# Patient Record
Sex: MALE | Race: WHITE | NOT HISPANIC OR LATINO | Employment: FULL TIME | URBAN - METROPOLITAN AREA
[De-identification: names, ages, dates, MRNs, and addresses within clinical notes are randomized per-mention and may not be internally consistent; named-entity substitution may affect disease eponyms.]

---

## 2018-05-03 ENCOUNTER — OFFICE VISIT (OUTPATIENT)
Dept: UROLOGY | Facility: MEDICAL CENTER | Age: 48
End: 2018-05-03
Payer: COMMERCIAL

## 2018-05-03 VITALS — WEIGHT: 206 LBS | HEIGHT: 72 IN | BODY MASS INDEX: 27.9 KG/M2

## 2018-05-03 DIAGNOSIS — R35.0 FREQUENCY OF URINATION: Primary | ICD-10-CM

## 2018-05-03 LAB
SL AMB  POCT GLUCOSE, UA: NORMAL
SL AMB LEUKOCYTE ESTERASE,UA: NORMAL
SL AMB POCT BILIRUBIN,UA: NORMAL
SL AMB POCT BLOOD,UA: NORMAL
SL AMB POCT CLARITY,UA: CLEAR
SL AMB POCT COLOR,UA: YELLOW
SL AMB POCT KETONES,UA: NORMAL
SL AMB POCT NITRITE,UA: NORMAL
SL AMB POCT PH,UA: 5.5
SL AMB POCT SPECIFIC GRAVITY,UA: 1.03
SL AMB POCT URINE PROTEIN: NORMAL
SL AMB POCT UROBILINOGEN: 0.2

## 2018-05-03 PROCEDURE — 99204 OFFICE O/P NEW MOD 45 MIN: CPT | Performed by: UROLOGY

## 2018-05-03 PROCEDURE — 81003 URINALYSIS AUTO W/O SCOPE: CPT | Performed by: UROLOGY

## 2018-05-03 NOTE — PROGRESS NOTES
Assessment/Plan:  1  Idiopathic long-term frequency, consistent with overactive bladder  No evidence of any physical or neurologic condition responsible for it  2   Options discussed, while history early can live with it, it is annoying as Erica Ornelas  I gave him some samples of Myrbetriq 25 mg per day, warned about side effects  If he wants to get a script to follow we will do so, I warned him might be expensive being brand name  3   Follow-up six months  No problem-specific Assessment & Plan notes found for this encounter  Diagnoses and all orders for this visit:    Frequency of urination  -     POCT urine dip auto non-scope          Subjective:      Patient ID: Lyssa Soares is a 41 y o  male  Long-term urinary for see going back at least 5-10 years  Worse the past 6-12 months  Will have to urinate quite soon after drinking liquids, voids every 1-2 hours all day long  He has learned to cut back is liquids before going to bed about 3 hours, and if he does this, only nocturia x1  Other times nocturia times 2-5  No burning, change in flow, history of infections stones incontinence or blood  Father has large BPH but no cancer  The following portions of the patient's history were reviewed and updated as appropriate: allergies, current medications, past family history, past medical history, past social history, past surgical history and problem list     Review of Systems   Musculoskeletal:        Needs a hip replacement   All other systems reviewed and are negative  Objective:      Ht 6' (1 829 m)   Wt 93 4 kg (206 lb)   BMI 27 94 kg/m²          Physical Exam   Constitutional: He is oriented to person, place, and time  He appears well-developed and well-nourished  No distress  HENT:   Head: Normocephalic and atraumatic  Eyes: Conjunctivae are normal    Cardiovascular: Normal rate and regular rhythm      Pulmonary/Chest: Effort normal and breath sounds normal  No respiratory distress  He has no wheezes  Abdominal: Soft  Bowel sounds are normal  He exhibits no distension and no mass  There is no tenderness  Genitourinary: Testes normal and penis normal    Genitourinary Comments: Prostate small, normal for age   Neurological: He is alert and oriented to person, place, and time  Skin: Skin is warm and dry  He is not diaphoretic

## 2023-03-06 ENCOUNTER — TELEPHONE (OUTPATIENT)
Dept: UROLOGY | Facility: AMBULATORY SURGERY CENTER | Age: 53
End: 2023-03-06

## 2023-03-06 NOTE — TELEPHONE ENCOUNTER
New Patient    What is the reason for the patient’s appointment? Prostate cancer screening frequent urinating tenderness of testicular     What office location does the patient prefer? [facility]    Imaging/Lab Results:    Do we accept the patient's insurance or is the patient Self-Pay? Yes     Insurance Provider: Shala Sage: [id number]  Member ID#: [id number]    Has the patient had any previous Urologist(s)? Dr Abida Thrasher 2018     Have patient records been requested? If not are records showing in [address] [place] [address] Rd: records in [facility]     Has the patient had any outside testing done? No     Does the patient have a personal history of cancer?  No

## 2023-03-20 ENCOUNTER — OFFICE VISIT (OUTPATIENT)
Dept: UROLOGY | Facility: MEDICAL CENTER | Age: 53
End: 2023-03-20

## 2023-03-20 VITALS
WEIGHT: 221.2 LBS | BODY MASS INDEX: 29.96 KG/M2 | OXYGEN SATURATION: 100 % | HEART RATE: 72 BPM | HEIGHT: 72 IN | DIASTOLIC BLOOD PRESSURE: 92 MMHG | SYSTOLIC BLOOD PRESSURE: 164 MMHG

## 2023-03-20 DIAGNOSIS — N50.811 RIGHT TESTICULAR PAIN: Primary | ICD-10-CM

## 2023-03-20 DIAGNOSIS — R35.0 FREQUENCY OF URINATION: ICD-10-CM

## 2023-03-20 DIAGNOSIS — Z12.5 PROSTATE CANCER SCREENING: ICD-10-CM

## 2023-03-20 LAB
BACTERIA UR QL AUTO: ABNORMAL /HPF
BILIRUB UR QL STRIP: NEGATIVE
CLARITY UR: CLEAR
COLOR UR: ABNORMAL
GLUCOSE UR STRIP-MCNC: NEGATIVE MG/DL
HGB UR QL STRIP.AUTO: ABNORMAL
KETONES UR STRIP-MCNC: NEGATIVE MG/DL
LEUKOCYTE ESTERASE UR QL STRIP: NEGATIVE
NITRITE UR QL STRIP: NEGATIVE
NON-SQ EPI CELLS URNS QL MICRO: ABNORMAL /HPF
PH UR STRIP.AUTO: 5.5 [PH]
PROT UR STRIP-MCNC: NEGATIVE MG/DL
RBC #/AREA URNS AUTO: ABNORMAL /HPF
SL AMB  POCT GLUCOSE, UA: ABNORMAL
SL AMB LEUKOCYTE ESTERASE,UA: ABNORMAL
SL AMB POCT BILIRUBIN,UA: ABNORMAL
SL AMB POCT BLOOD,UA: ABNORMAL
SL AMB POCT CLARITY,UA: CLEAR
SL AMB POCT COLOR,UA: YELLOW
SL AMB POCT KETONES,UA: ABNORMAL
SL AMB POCT NITRITE,UA: ABNORMAL
SL AMB POCT PH,UA: 5.5
SL AMB POCT SPECIFIC GRAVITY,UA: 1.02
SL AMB POCT URINE PROTEIN: ABNORMAL
SL AMB POCT UROBILINOGEN: 0.2
SP GR UR STRIP.AUTO: 1.02 (ref 1–1.03)
UROBILINOGEN UR STRIP-ACNC: <2 MG/DL
WBC #/AREA URNS AUTO: ABNORMAL /HPF

## 2023-03-20 NOTE — PROGRESS NOTES
3/20/2023      Chief Complaint   Patient presents with   • New Patient Visit         Assessment and Plan    50 y o  male new patient     1  Urinary frequency  · Urine today: small blood  · Will send out for UA micro  · Suspect secondary to daily bladder irritants  Thorough discussion regarding dietary modifications and importance of 40 to 60 ounces of water daily  · If no improvement with dietary modifications, could discuss starting oral antispasmodic medication  · Otherwise follow up in 1 year  2  Weak stream  · Patient has mild urinary symptoms that may be secondary to a mildly enlarged prostate  Discussed starting oral medication such as alpha-blocker but patient defers at this time  3  Right testicular discomfort   · No fullness, erythema, or tenderness on exam   · US scrotum, testicles ordered at this time  · Will call with results     4  Prostate cancer screening  · Denies family history  No prior testing  · GIANNA today revealed with, mildly enlarged prostate without appreciable nodule  · PSA ordered for 2 weeks after today's exam   Reviewed activities to avoid 72 hours prior to testing  · We will call with results  History of Present Illness  Kaley Pope is a 50 y o  male here for evaluation of frequency, testicular tenderness, and prostate cancer screening  Patient was evaluated by Dr Dorrene Saint in 2018 for similar frequency symptoms  Patient was diagnosed with OAB but did not feel oral medication was necessary at that time  Patient reports today and states his urinary frequency has remained the same  He does note drinking 7 to 8 cups of coffee a day which is likely contributing to his urinary frequency  He does note slower stream but denies any difficulty voiding  He only has nocturia if he has fluids before bed  He denies any dysuria or gross hematuria  He does note mild intermittent right testicular aching for 2 to 3 months    He denies any issues in the past   He denies any fevers or chills  Gladis Arias is half  half -American and has not undergone prostate cancer screening to date  He denies any family history of prostate cancer but does note his father has a history of BPH  He is interested in prostate cancer screening at this time  Review of Systems   Constitutional: Negative for chills and fever  HENT: Negative  Respiratory: Negative  Genitourinary: Positive for frequency, testicular pain (right x 2-3 month) and urgency (occasional, only with increased fluids  Very rarely has urge urinary incontinence)  Negative for dysuria and hematuria  Has nocturia once if he has fluids before bed  Notes intermittent stream   Denies sensation of incomplete bladder emptying  Neurological: Negative  Vitals  Vitals:    03/20/23 0828   BP: 164/92   BP Location: Left arm   Patient Position: Sitting   Cuff Size: Adult   Pulse: 72   SpO2: 100%   Weight: 100 kg (221 lb 3 2 oz)   Height: 6' (1 829 m)       Physical Exam  Vitals reviewed  Constitutional:       General: He is not in acute distress  Appearance: Normal appearance  He is not ill-appearing, toxic-appearing or diaphoretic  HENT:      Head: Normocephalic and atraumatic  Eyes:      Conjunctiva/sclera: Conjunctivae normal    Pulmonary:      Effort: Pulmonary effort is normal  No respiratory distress  Abdominal:      General: There is no distension  Palpations: Abdomen is soft  Tenderness: There is no abdominal tenderness  There is no right CVA tenderness, left CVA tenderness, guarding or rebound  Genitourinary:     Comments: Circumcised penis, normal phallus, orthotopic patent meatus  Testes smooth descended bilaterally into the scrotum nontender with no palpable mass  GIANNA today revealed with, mildly enlarged prostate without appreciable nodule  Musculoskeletal:         General: Normal range of motion  Cervical back: Normal range of motion  Skin:     General: Skin is warm and dry  Neurological:      General: No focal deficit present  Mental Status: He is alert and oriented to person, place, and time  Psychiatric:         Mood and Affect: Mood normal          Behavior: Behavior normal          Thought Content:  Thought content normal          Judgment: Judgment normal          Past History  Past Medical History:   Diagnosis Date   • Hypertension      Social History     Socioeconomic History   • Marital status: /Civil Union     Spouse name: None   • Number of children: None   • Years of education: None   • Highest education level: None   Occupational History   • None   Tobacco Use   • Smoking status: Never   • Smokeless tobacco: Never   Vaping Use   • Vaping Use: Never used   Substance and Sexual Activity   • Alcohol use: No   • Drug use: No   • Sexual activity: None   Other Topics Concern   • None   Social History Narrative   • None     Social Determinants of Health     Financial Resource Strain: Not on file   Food Insecurity: Not on file   Transportation Needs: Not on file   Physical Activity: Not on file   Stress: Not on file   Social Connections: Not on file   Intimate Partner Violence: Not on file   Housing Stability: Not on file     Social History     Tobacco Use   Smoking Status Never   Smokeless Tobacco Never     Family History   Problem Relation Age of Onset   • Hypertension Mother        The following portions of the patient's history were reviewed and updated as appropriate: allergies, current medications, past medical history, past social history, past surgical history and problem list     Results  Recent Results (from the past 1 hour(s))   POCT urine dip    Collection Time: 03/20/23  9:12 AM   Result Value Ref Range    LEUKOCYTE ESTERASE,UA -     NITRITE,UA -     SL AMB POCT UROBILINOGEN 0 2     POCT URINE PROTEIN -      PH,UA 5 5     BLOOD,UA small     SPECIFIC GRAVITY,UA 1 020     KETONES,UA -     BILIRUBIN,UA -     GLUCOSE, UA -      COLOR,UA yellow     CLARITY,UA clear    ]  No results found for: PSA  No results found for: GLUCOSE, CALCIUM, NA, K, CO2, CL, BUN, CREATININE  No results found for: WBC, HGB, HCT, MCV, PLT    Regina Kruger PA-C